# Patient Record
Sex: MALE
[De-identification: names, ages, dates, MRNs, and addresses within clinical notes are randomized per-mention and may not be internally consistent; named-entity substitution may affect disease eponyms.]

---

## 2019-11-14 ENCOUNTER — HOSPITAL ENCOUNTER (EMERGENCY)
Dept: HOSPITAL 90 - EDH | Age: 44
Discharge: HOME | End: 2019-11-14
Payer: MEDICAID

## 2019-11-14 DIAGNOSIS — Z88.0: ICD-10-CM

## 2019-11-14 DIAGNOSIS — J20.9: Primary | ICD-10-CM

## 2019-11-14 PROCEDURE — 71046 X-RAY EXAM CHEST 2 VIEWS: CPT

## 2019-11-14 PROCEDURE — 87804 INFLUENZA ASSAY W/OPTIC: CPT

## 2021-11-11 ENCOUNTER — HOSPITAL ENCOUNTER (EMERGENCY)
Dept: HOSPITAL 90 - EDH | Age: 46
Discharge: HOME | End: 2021-11-11
Payer: MEDICAID

## 2021-11-11 VITALS — DIASTOLIC BLOOD PRESSURE: 103 MMHG | SYSTOLIC BLOOD PRESSURE: 163 MMHG

## 2021-11-11 VITALS — HEIGHT: 67 IN | BODY MASS INDEX: 22.77 KG/M2 | WEIGHT: 145.06 LBS

## 2021-11-11 DIAGNOSIS — F10.20: ICD-10-CM
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
## 2021-11-11 DIAGNOSIS — S00.81XA: ICD-10-CM
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
## 2021-11-11 DIAGNOSIS — S00.03XA: Primary | ICD-10-CM

## 2021-11-11 LAB
ALBUMIN SERPL-MCNC: 4.3 G/DL (ref 3.5–5)
ALT SERPL-CCNC: 170 U/L (ref 12–78)
AST SERPL-CCNC: 151 U/L (ref 10–37)
BASOPHILS NFR BLD AUTO: 1.7 % (ref 0–5)
BILIRUB SERPL-MCNC: 0.3 MG/DL (ref 0.2–1)
BUN SERPL-MCNC: 9 MG/DL (ref 7–18)
CHLORIDE SERPL-SCNC: 99 MMOL/L (ref 101–111)
CO2 SERPL-SCNC: 29 MMOL/L (ref 21–32)
CREAT SERPL-MCNC: 0.8 MG/DL (ref 0.5–1.5)
EOSINOPHIL NFR BLD AUTO: 1.7 % (ref 0–8)
ERYTHROCYTE [DISTWIDTH] IN BLOOD BY AUTOMATED COUNT: 13.2 % (ref 11–15.5)
GFR SERPL CREATININE-BSD FRML MDRD: 112 ML/MIN (ref 60–?)
GLUCOSE SERPL-MCNC: 102 MG/DL (ref 70–105)
HCT VFR BLD AUTO: 39.2 % (ref 42–54)
LYMPHOCYTES NFR SPEC AUTO: 17.7 % (ref 21–51)
MCH RBC QN AUTO: 31.8 PG (ref 27–33)
MCHC RBC AUTO-ENTMCNC: 34.2 G/DL (ref 32–36)
MCV RBC AUTO: 93.1 FL (ref 79–99)
MONOCYTES NFR BLD AUTO: 11.2 % (ref 3–13)
NEUTROPHILS NFR BLD AUTO: 67.3 % (ref 40–77)
NRBC BLD MANUAL-RTO: 0 % (ref 0–0.19)
PLATELET # BLD AUTO: 289 K/UL (ref 130–400)
POTASSIUM SERPL-SCNC: 3.6 MMOL/L (ref 3.5–5.1)
PROT SERPL-MCNC: 8.4 G/DL (ref 6–8.3)
RBC # BLD AUTO: 4.21 MIL/UL (ref 4.5–6.2)
SODIUM SERPL-SCNC: 137 MMOL/L (ref 136–145)
WBC # BLD AUTO: 4.6 K/UL (ref 4.8–10.8)

## 2021-11-11 PROCEDURE — 80053 COMPREHEN METABOLIC PANEL: CPT

## 2021-11-11 PROCEDURE — 36415 COLL VENOUS BLD VENIPUNCTURE: CPT

## 2021-11-11 PROCEDURE — 72125 CT NECK SPINE W/O DYE: CPT

## 2021-11-11 PROCEDURE — 70450 CT HEAD/BRAIN W/O DYE: CPT

## 2021-11-11 PROCEDURE — 85025 COMPLETE CBC W/AUTO DIFF WBC: CPT

## 2021-11-11 PROCEDURE — 70486 CT MAXILLOFACIAL W/O DYE: CPT

## 2021-11-12 ENCOUNTER — HOSPITAL ENCOUNTER (EMERGENCY)
Dept: HOSPITAL 90 - EDH | Age: 46
Discharge: HOME | End: 2021-11-12
Payer: MEDICAID

## 2021-11-12 VITALS — SYSTOLIC BLOOD PRESSURE: 135 MMHG | DIASTOLIC BLOOD PRESSURE: 70 MMHG

## 2021-11-12 VITALS — HEIGHT: 65 IN | BODY MASS INDEX: 21.34 KG/M2 | WEIGHT: 128.09 LBS

## 2021-11-12 DIAGNOSIS — S00.83XA: ICD-10-CM
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
## 2021-11-12 DIAGNOSIS — S00.12XA: Primary | ICD-10-CM
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
## 2021-11-12 DIAGNOSIS — Z88.0: ICD-10-CM

## 2021-12-10 ENCOUNTER — HOSPITAL ENCOUNTER (EMERGENCY)
Dept: HOSPITAL 90 - EDH | Age: 46
Discharge: HOME | End: 2021-12-10
Payer: MEDICAID

## 2021-12-10 VITALS — WEIGHT: 149.91 LBS | BODY MASS INDEX: 25.59 KG/M2 | HEIGHT: 64 IN

## 2021-12-10 VITALS — SYSTOLIC BLOOD PRESSURE: 135 MMHG | DIASTOLIC BLOOD PRESSURE: 89 MMHG

## 2021-12-10 DIAGNOSIS — R11.10: ICD-10-CM

## 2021-12-10 DIAGNOSIS — F10.129: Primary | ICD-10-CM

## 2022-04-13 ENCOUNTER — HOSPITAL ENCOUNTER (EMERGENCY)
Dept: HOSPITAL 90 - EDH | Age: 47
Discharge: HOME | End: 2022-04-13
Payer: MEDICAID

## 2022-04-13 VITALS — BODY MASS INDEX: 24.17 KG/M2 | HEIGHT: 65 IN | WEIGHT: 145.06 LBS

## 2022-04-13 VITALS — DIASTOLIC BLOOD PRESSURE: 87 MMHG | SYSTOLIC BLOOD PRESSURE: 135 MMHG
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
## 2022-04-13 DIAGNOSIS — H61.23: ICD-10-CM
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
## 2022-04-13 DIAGNOSIS — S50.01XA: Primary | ICD-10-CM
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
## 2023-01-27 ENCOUNTER — HOSPITAL ENCOUNTER (EMERGENCY)
Dept: HOSPITAL 90 - EDH | Age: 48
Discharge: HOME | End: 2023-01-27
Payer: MEDICAID

## 2023-01-27 VITALS — SYSTOLIC BLOOD PRESSURE: 155 MMHG | DIASTOLIC BLOOD PRESSURE: 82 MMHG

## 2023-01-27 VITALS — WEIGHT: 139.99 LBS | HEIGHT: 62 IN | BODY MASS INDEX: 25.76 KG/M2

## 2023-01-27 DIAGNOSIS — F10.239: Primary | ICD-10-CM

## 2023-01-27 DIAGNOSIS — Z88.0: ICD-10-CM

## 2023-01-27 LAB
ALBUMIN SERPL-MCNC: 4.3 G/DL (ref 3.5–5)
ALT SERPL-CCNC: 146 U/L (ref 12–78)
AMPHETAMINES UR QL SCN: NEGATIVE
APPEARANCE UR: CLEAR
AST SERPL-CCNC: 144 U/L (ref 10–37)
BACTERIA URNS QL MICRO: (no result) /HPF
BARBITURATES UR QL SCN: NEGATIVE
BASOPHILS NFR BLD AUTO: 0.9 % (ref 0–5)
BENZODIAZ UR QL SCN: NEGATIVE
BILIRUB UR QL STRIP: NEGATIVE MG/DL
BUN SERPL-MCNC: 16 MG/DL (ref 7–18)
BZE UR QL SCN: NEGATIVE
CANNABINOIDS UR QL SCN: NEGATIVE
CHLORIDE SERPL-SCNC: 92 MMOL/L (ref 101–111)
CK SERPL-CCNC: 199 U/L (ref 21–232)
CO2 SERPL-SCNC: 33 MMOL/L (ref 21–32)
COLOR UR: YELLOW
CREAT SERPL-MCNC: 1 MG/DL (ref 0.5–1.5)
DEPRECATED SQUAMOUS URNS QL MICRO: (no result) /HPF (ref 0–2)
EOSINOPHIL NFR BLD AUTO: 0.9 % (ref 0–8)
ERYTHROCYTE [DISTWIDTH] IN BLOOD BY AUTOMATED COUNT: 12.4 % (ref 11–15.5)
ETHANOL SERPL-MCNC: < 3 MG/DL (ref 0–10)
GFR SERPL CREATININE-BSD FRML MDRD: 86 ML/MIN (ref 60–?)
GLUCOSE SERPL-MCNC: 102 MG/DL (ref 70–105)
GLUCOSE UR STRIP-MCNC: NEGATIVE MG/DL
HCT VFR BLD AUTO: 41.4 % (ref 42–54)
HGB UR QL STRIP: (no result)
KETONES UR STRIP-MCNC: 20 MG/DL
LEUKOCYTE ESTERASE UR QL STRIP: NEGATIVE LEU/UL
LYMPHOCYTES NFR SPEC AUTO: 8.5 % (ref 21–51)
MAGNESIUM SERPL-MCNC: 1.8 MG/DL (ref 1.8–2.4)
MCH RBC QN AUTO: 31.9 PG (ref 27–33)
MCHC RBC AUTO-ENTMCNC: 34.1 G/DL (ref 32–36)
MCV RBC AUTO: 93.7 FL (ref 79–99)
MONOCYTES NFR BLD AUTO: 14.2 % (ref 3–13)
MUCOUS THREADS URNS QL MICRO: (no result) LPF
NEUTROPHILS NFR BLD AUTO: 75.2 % (ref 40–77)
NITRITE UR QL STRIP: NEGATIVE
NRBC BLD MANUAL-RTO: 0 % (ref 0–0.19)
OPIATES UR QL SCN: NEGATIVE
PCP UR QL SCN: NEGATIVE
PH UR STRIP: 7 [PH] (ref 5–8)
PLATELET # BLD AUTO: 267 K/UL (ref 130–400)
POTASSIUM SERPL-SCNC: 3.7 MMOL/L (ref 3.5–5.1)
PROT SERPL-MCNC: 8.9 G/DL (ref 6–8.3)
PROT UR QL STRIP: 50 MG/DL
RBC # BLD AUTO: 4.42 MIL/UL (ref 4.5–6.2)
RBC #/AREA URNS HPF: (no result) /HPF (ref 0–1)
SODIUM SERPL-SCNC: 131 MMOL/L (ref 136–145)
SP GR UR STRIP: 1.01 (ref 1–1.03)
UROBILINOGEN UR STRIP-MCNC: 2 MG/DL (ref 0.2–1)
WBC # BLD AUTO: 7 K/UL (ref 4.8–10.8)
WBC #/AREA URNS HPF: (no result) /HPF (ref 0–1)

## 2023-01-27 PROCEDURE — 36415 COLL VENOUS BLD VENIPUNCTURE: CPT

## 2023-01-27 PROCEDURE — 81001 URINALYSIS AUTO W/SCOPE: CPT

## 2023-01-27 PROCEDURE — 83735 ASSAY OF MAGNESIUM: CPT

## 2023-01-27 PROCEDURE — 80053 COMPREHEN METABOLIC PANEL: CPT

## 2023-01-27 PROCEDURE — 99284 EMERGENCY DEPT VISIT MOD MDM: CPT

## 2023-01-27 PROCEDURE — 93005 ELECTROCARDIOGRAM TRACING: CPT

## 2023-01-27 PROCEDURE — 82550 ASSAY OF CK (CPK): CPT

## 2023-01-27 PROCEDURE — 96376 TX/PRO/DX INJ SAME DRUG ADON: CPT

## 2023-01-27 PROCEDURE — 96375 TX/PRO/DX INJ NEW DRUG ADDON: CPT

## 2023-01-27 PROCEDURE — 85025 COMPLETE CBC W/AUTO DIFF WBC: CPT

## 2023-01-27 PROCEDURE — 96374 THER/PROPH/DIAG INJ IV PUSH: CPT

## 2023-01-27 PROCEDURE — 83605 ASSAY OF LACTIC ACID: CPT

## 2023-01-27 PROCEDURE — 96361 HYDRATE IV INFUSION ADD-ON: CPT

## 2023-01-27 PROCEDURE — 80305 DRUG TEST PRSMV DIR OPT OBS: CPT

## 2023-10-23 ENCOUNTER — HOSPITAL ENCOUNTER (EMERGENCY)
Dept: HOSPITAL 90 - EDH | Age: 48
Discharge: HOME | End: 2023-10-23
Payer: MEDICAID

## 2023-10-23 VITALS
DIASTOLIC BLOOD PRESSURE: 78 MMHG | RESPIRATION RATE: 20 BRPM | SYSTOLIC BLOOD PRESSURE: 126 MMHG | HEART RATE: 89 BPM | OXYGEN SATURATION: 98 %

## 2023-10-23 VITALS — HEIGHT: 65 IN | WEIGHT: 130.07 LBS | BODY MASS INDEX: 21.67 KG/M2

## 2023-10-23 DIAGNOSIS — Z88.0: ICD-10-CM

## 2023-10-23 DIAGNOSIS — F10.20: Primary | ICD-10-CM

## 2023-10-23 DIAGNOSIS — R10.12: ICD-10-CM

## 2023-10-23 DIAGNOSIS — R11.2: ICD-10-CM
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
## 2023-10-23 DIAGNOSIS — R10.13: ICD-10-CM

## 2023-10-23 LAB
ALBUMIN SERPL-MCNC: 4.6 G/DL (ref 3.5–5)
ALT SERPL-CCNC: 84 U/L (ref 12–78)
AMPHETAMINES UR QL SCN: NEGATIVE
APPEARANCE UR: CLEAR
AST SERPL-CCNC: 70 U/L (ref 10–37)
BARBITURATES UR QL SCN: NEGATIVE
BASOPHILS # BLD AUTO: 0.03 K/UL (ref 0–0.2)
BASOPHILS NFR BLD AUTO: 0.4 % (ref 0–5)
BENZODIAZ UR QL SCN: NEGATIVE
BILIRUB SERPL-MCNC: 0.8 MG/DL (ref 0.2–1)
BILIRUB UR QL STRIP: NEGATIVE MG/DL
BUN SERPL-MCNC: 14 MG/DL (ref 7–18)
BZE UR QL SCN: NEGATIVE
CANNABINOIDS UR QL SCN: NEGATIVE
CHLORIDE SERPL-SCNC: 94 MMOL/L (ref 101–111)
CO2 SERPL-SCNC: 33 MMOL/L (ref 21–32)
COLOR UR: YELLOW
CREAT SERPL-MCNC: 1 MG/DL (ref 0.5–1.5)
EOSINOPHIL # BLD AUTO: 0 K/UL (ref 0–0.7)
EOSINOPHIL NFR BLD AUTO: 0 % (ref 0–8)
ERYTHROCYTE [DISTWIDTH] IN BLOOD BY AUTOMATED COUNT: 12.6 % (ref 11–15.5)
ETHANOL SERPL-MCNC: < 3 MG/DL (ref 0–10)
GFR SERPL CREATININE-BSD FRML MDRD: 94 ML/MIN (ref 90–?)
GLUCOSE SERPL-MCNC: 154 MG/DL (ref 70–105)
GLUCOSE UR STRIP-MCNC: NEGATIVE MG/DL
HCT VFR BLD AUTO: 41.6 % (ref 42–54)
HGB UR QL STRIP: (no result)
IMM GRANULOCYTES # BLD: 0.03 K/UL (ref 0–1)
KETONES UR STRIP-MCNC: 15 MG/DL
LEUKOCYTE ESTERASE UR QL STRIP: NEGATIVE LEU/UL
LYMPHOCYTES # SPEC AUTO: 0.2 K/UL (ref 1–4.8)
LYMPHOCYTES NFR SPEC AUTO: 2.6 % (ref 21–51)
MCH RBC QN AUTO: 31.2 PG (ref 27–33)
MCHC RBC AUTO-ENTMCNC: 34.1 G/DL (ref 32–36)
MCV RBC AUTO: 91.4 FL (ref 79–99)
MICRO URNS: YES
MONOCYTES # BLD AUTO: 0.5 K/UL (ref 0.1–1)
MONOCYTES NFR BLD AUTO: 6.5 % (ref 3–13)
NEUTROPHILS # BLD AUTO: 6.9 K/UL (ref 1.8–7.7)
NEUTROPHILS NFR BLD AUTO: 90.1 % (ref 40–77)
NITRITE UR QL STRIP: NEGATIVE
NRBC BLD MANUAL-RTO: 0 % (ref 0–0.19)
OPIATES UR QL SCN: NEGATIVE
PCP UR QL SCN: NEGATIVE
PH UR STRIP: 8 [PH] (ref 5–8)
PLATELET # BLD AUTO: 273 K/UL (ref 130–400)
POTASSIUM SERPL-SCNC: 3.4 MMOL/L (ref 3.5–5.1)
PROT SERPL-MCNC: 9.3 G/DL (ref 6–8.3)
PROT UR QL STRIP: 100 MG/DL
RBC # BLD AUTO: 4.55 MIL/UL (ref 4.5–6.2)
RBC #/AREA URNS HPF: (no result) /HPF (ref 0–1)
SODIUM SERPL-SCNC: 135 MMOL/L (ref 136–145)
SP GR UR STRIP: 1.01 (ref 1–1.03)
UROBILINOGEN UR STRIP-MCNC: 0.2 MG/DL (ref 0.2–1)
WBC # BLD AUTO: 7.7 K/UL (ref 4.8–10.8)
WBC #/AREA URNS HPF: (no result) /HPF (ref 0–1)
WBC MORPH BLD: (no result)

## 2023-10-23 PROCEDURE — 81001 URINALYSIS AUTO W/SCOPE: CPT

## 2023-10-23 PROCEDURE — 80053 COMPREHEN METABOLIC PANEL: CPT

## 2023-10-23 PROCEDURE — 82140 ASSAY OF AMMONIA: CPT

## 2023-10-23 PROCEDURE — 96361 HYDRATE IV INFUSION ADD-ON: CPT

## 2023-10-23 PROCEDURE — 74177 CT ABD & PELVIS W/CONTRAST: CPT

## 2023-10-23 PROCEDURE — C9113 INJ PANTOPRAZOLE SODIUM, VIA: HCPCS

## 2023-10-23 PROCEDURE — 36415 COLL VENOUS BLD VENIPUNCTURE: CPT

## 2023-10-23 PROCEDURE — 96374 THER/PROPH/DIAG INJ IV PUSH: CPT

## 2023-10-23 PROCEDURE — 85025 COMPLETE CBC W/AUTO DIFF WBC: CPT

## 2023-10-23 PROCEDURE — 80305 DRUG TEST PRSMV DIR OPT OBS: CPT

## 2023-10-23 PROCEDURE — 83690 ASSAY OF LIPASE: CPT

## 2023-10-23 PROCEDURE — 96375 TX/PRO/DX INJ NEW DRUG ADDON: CPT

## 2023-10-23 PROCEDURE — 99285 EMERGENCY DEPT VISIT HI MDM: CPT

## 2023-10-27 ENCOUNTER — HOSPITAL ENCOUNTER (EMERGENCY)
Dept: HOSPITAL 90 - EDH | Age: 48
Discharge: HOME | End: 2023-10-27
Payer: MEDICAID

## 2023-10-27 VITALS
SYSTOLIC BLOOD PRESSURE: 127 MMHG | DIASTOLIC BLOOD PRESSURE: 77 MMHG | HEART RATE: 66 BPM | RESPIRATION RATE: 18 BRPM | OXYGEN SATURATION: 98 %

## 2023-10-27 VITALS — WEIGHT: 125 LBS | HEIGHT: 61 IN | BODY MASS INDEX: 23.6 KG/M2

## 2023-10-27 VITALS
DIASTOLIC BLOOD PRESSURE: 84 MMHG | SYSTOLIC BLOOD PRESSURE: 126 MMHG | OXYGEN SATURATION: 100 % | HEART RATE: 78 BPM | RESPIRATION RATE: 16 BRPM
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
## 2023-10-27 DIAGNOSIS — S90.32XA: Primary | ICD-10-CM

## 2023-10-27 DIAGNOSIS — F10.20: ICD-10-CM

## 2023-10-27 DIAGNOSIS — F41.9: ICD-10-CM

## 2023-10-27 DIAGNOSIS — Z88.0: ICD-10-CM
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
## 2023-10-27 DIAGNOSIS — R91.8: ICD-10-CM
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
## 2023-10-27 DIAGNOSIS — F20.9: ICD-10-CM

## 2023-10-27 DIAGNOSIS — E87.6: Primary | ICD-10-CM

## 2023-10-27 DIAGNOSIS — E86.0: ICD-10-CM
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
## 2023-10-27 LAB
ALBUMIN SERPL-MCNC: 4.5 G/DL (ref 3.5–5)
ALT SERPL-CCNC: 80 U/L (ref 12–78)
AMPHETAMINES UR QL SCN: NEGATIVE
APPEARANCE UR: (no result)
AST SERPL-CCNC: 92 U/L (ref 10–37)
BARBITURATES UR QL SCN: NEGATIVE
BASOPHILS # BLD AUTO: 0.02 K/UL (ref 0–0.2)
BASOPHILS NFR BLD AUTO: 0.2 % (ref 0–5)
BENZODIAZ UR QL SCN: NEGATIVE
BILIRUB SERPL-MCNC: 1.2 MG/DL (ref 0.2–1)
BILIRUB UR QL STRIP: NEGATIVE MG/DL
BUN SERPL-MCNC: 27 MG/DL (ref 7–18)
BUN SERPL-MCNC: 29 MG/DL (ref 7–18)
BZE UR QL SCN: NEGATIVE
CANNABINOIDS UR QL SCN: NEGATIVE
CHLORIDE SERPL-SCNC: 95 MMOL/L (ref 101–111)
CHLORIDE SERPL-SCNC: 95 MMOL/L (ref 101–111)
CO2 SERPL-SCNC: 29 MMOL/L (ref 21–32)
CO2 SERPL-SCNC: 29 MMOL/L (ref 21–32)
COLOR UR: YELLOW
CREAT SERPL-MCNC: 1.2 MG/DL (ref 0.5–1.5)
CREAT SERPL-MCNC: 1.4 MG/DL (ref 0.5–1.5)
EOSINOPHIL # BLD AUTO: 0.01 K/UL (ref 0–0.7)
EOSINOPHIL NFR BLD AUTO: 0.1 % (ref 0–8)
ERYTHROCYTE [DISTWIDTH] IN BLOOD BY AUTOMATED COUNT: 12.4 % (ref 11–15.5)
ETHANOL SERPL-MCNC: < 3 MG/DL (ref 0–10)
GFR SERPL CREATININE-BSD FRML MDRD: 63 ML/MIN (ref 90–?)
GFR SERPL CREATININE-BSD FRML MDRD: 76 ML/MIN (ref 90–?)
GLUCOSE SERPL-MCNC: 80 MG/DL (ref 70–105)
GLUCOSE SERPL-MCNC: 91 MG/DL (ref 70–105)
GLUCOSE UR STRIP-MCNC: NEGATIVE MG/DL
HCT VFR BLD AUTO: 38 % (ref 42–54)
HGB UR QL STRIP: (no result)
IMM GRANULOCYTES # BLD: 0.05 K/UL (ref 0–1)
KETONES UR STRIP-MCNC: 10 MG/DL
LEUKOCYTE ESTERASE UR QL STRIP: NEGATIVE LEU/UL
LYMPHOCYTES # SPEC AUTO: 0.3 K/UL (ref 1–4.8)
LYMPHOCYTES NFR SPEC AUTO: 2.3 % (ref 21–51)
MCH RBC QN AUTO: 32 PG (ref 27–33)
MCHC RBC AUTO-ENTMCNC: 35.3 G/DL (ref 32–36)
MCV RBC AUTO: 90.7 FL (ref 79–99)
MICRO URNS: YES
MONOCYTES # BLD AUTO: 1.1 K/UL (ref 0.1–1)
MONOCYTES NFR BLD AUTO: 8.8 % (ref 3–13)
MUCOUS THREADS URNS QL MICRO: (no result) LPF
NEUTROPHILS # BLD AUTO: 10.6 K/UL (ref 1.8–7.7)
NEUTROPHILS NFR BLD AUTO: 88.2 % (ref 40–77)
NITRITE UR QL STRIP: NEGATIVE
NRBC BLD MANUAL-RTO: 0 % (ref 0–0.19)
OPIATES UR QL SCN: NEGATIVE
PCP UR QL SCN: NEGATIVE
PH UR STRIP: 6 [PH] (ref 5–8)
PLATELET # BLD AUTO: 245 K/UL (ref 130–400)
POTASSIUM SERPL-SCNC: 2.7 MMOL/L (ref 3.5–5.1)
POTASSIUM SERPL-SCNC: 3.4 MMOL/L (ref 3.5–5.1)
PROT SERPL-MCNC: 8.8 G/DL (ref 6–8.3)
PROT UR QL STRIP: 70 MG/DL
RBC # BLD AUTO: 4.19 MIL/UL (ref 4.5–6.2)
RBC #/AREA URNS HPF: (no result) /HPF (ref 0–1)
SODIUM SERPL-SCNC: 132 MMOL/L (ref 136–145)
SODIUM SERPL-SCNC: 133 MMOL/L (ref 136–145)
SP GR UR STRIP: 1.02 (ref 1–1.03)
UROBILINOGEN UR STRIP-MCNC: 0.2 MG/DL (ref 0.2–1)
WBC # BLD AUTO: 12 K/UL (ref 4.8–10.8)
WBC #/AREA URNS HPF: (no result) /HPF (ref 0–1)

## 2023-10-27 PROCEDURE — 81001 URINALYSIS AUTO W/SCOPE: CPT

## 2023-10-27 PROCEDURE — 36415 COLL VENOUS BLD VENIPUNCTURE: CPT

## 2023-10-27 PROCEDURE — 80305 DRUG TEST PRSMV DIR OPT OBS: CPT

## 2023-10-27 PROCEDURE — 72125 CT NECK SPINE W/O DYE: CPT

## 2023-10-27 PROCEDURE — 80053 COMPREHEN METABOLIC PANEL: CPT

## 2023-10-27 PROCEDURE — 85025 COMPLETE CBC W/AUTO DIFF WBC: CPT

## 2023-10-27 PROCEDURE — 90714 TD VACC NO PRESV 7 YRS+ IM: CPT

## 2023-10-27 PROCEDURE — 73630 X-RAY EXAM OF FOOT: CPT

## 2023-10-27 PROCEDURE — 83735 ASSAY OF MAGNESIUM: CPT

## 2023-10-27 PROCEDURE — 70450 CT HEAD/BRAIN W/O DYE: CPT

## 2023-10-27 PROCEDURE — 99283 EMERGENCY DEPT VISIT LOW MDM: CPT

## 2023-10-27 PROCEDURE — 96360 HYDRATION IV INFUSION INIT: CPT

## 2023-10-27 PROCEDURE — 90471 IMMUNIZATION ADMIN: CPT

## 2023-10-27 PROCEDURE — 71045 X-RAY EXAM CHEST 1 VIEW: CPT

## 2023-10-27 PROCEDURE — 72170 X-RAY EXAM OF PELVIS: CPT

## 2023-10-27 PROCEDURE — 80048 BASIC METABOLIC PNL TOTAL CA: CPT

## 2023-10-27 PROCEDURE — 99285 EMERGENCY DEPT VISIT HI MDM: CPT

## 2024-04-21 ENCOUNTER — HOSPITAL ENCOUNTER (EMERGENCY)
Dept: HOSPITAL 90 - EDH | Age: 49
Discharge: LEFT BEFORE DISCHARGE | End: 2024-04-21
Payer: MEDICAID

## 2024-04-21 VITALS — WEIGHT: 139.99 LBS | HEIGHT: 64 IN | BODY MASS INDEX: 23.9 KG/M2

## 2024-04-21 VITALS
SYSTOLIC BLOOD PRESSURE: 132 MMHG | HEART RATE: 90 BPM | DIASTOLIC BLOOD PRESSURE: 88 MMHG | OXYGEN SATURATION: 98 % | RESPIRATION RATE: 18 BRPM

## 2024-04-21 DIAGNOSIS — Z88.0: ICD-10-CM

## 2024-04-21 DIAGNOSIS — F10.129: Primary | ICD-10-CM
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
## 2024-04-21 LAB
ALBUMIN SERPL-MCNC: 4 G/DL (ref 3.5–5)
ALT SERPL-CCNC: 20 U/L (ref 12–78)
AMPHETAMINES UR QL SCN: NEGATIVE
APAP SERPL-MCNC: < 1 MCG/ML (ref 10–29)
APPEARANCE UR: CLEAR
AST SERPL-CCNC: 17 U/L (ref 10–37)
BARBITURATES UR QL SCN: NEGATIVE
BASOPHILS # BLD AUTO: 0.05 K/UL (ref 0–0.2)
BASOPHILS NFR BLD AUTO: 0.9 % (ref 0–5)
BENZODIAZ UR QL SCN: NEGATIVE
BILIRUB SERPL-MCNC: 0.2 MG/DL (ref 0.2–1)
BILIRUB UR QL STRIP: NEGATIVE MG/DL
BUN SERPL-MCNC: 8 MG/DL (ref 7–18)
BZE UR QL SCN: NEGATIVE
CANNABINOIDS UR QL SCN: NEGATIVE
CHLORIDE SERPL-SCNC: 101 MMOL/L (ref 101–111)
CO2 SERPL-SCNC: 32 MMOL/L (ref 21–32)
COLOR UR: COLORLESS
CREAT SERPL-MCNC: 0.8 MG/DL (ref 0.5–1.3)
EOSINOPHIL # BLD AUTO: 0.39 K/UL (ref 0–0.7)
EOSINOPHIL NFR BLD AUTO: 6.7 % (ref 0–8)
ERYTHROCYTE [DISTWIDTH] IN BLOOD BY AUTOMATED COUNT: 12.5 % (ref 11–15.5)
ETHANOL SERPL-MCNC: 227 MG/DL (ref 0–10)
GFR SERPL CREATININE-BSD FRML MDRD: 110 ML/MIN (ref 90–?)
GLUCOSE SERPL-MCNC: 123 MG/DL (ref 70–105)
GLUCOSE UR STRIP-MCNC: NEGATIVE MG/DL
HCT VFR BLD AUTO: 41.7 % (ref 42–54)
HGB UR QL STRIP: NEGATIVE
IMM GRANULOCYTES # BLD: 0.01 K/UL (ref 0–1)
KETONES UR STRIP-MCNC: NEGATIVE MG/DL
LEUKOCYTE ESTERASE UR QL STRIP: NEGATIVE LEU/UL
LYMPHOCYTES # SPEC AUTO: 1.5 K/UL (ref 1–4.8)
LYMPHOCYTES NFR SPEC AUTO: 26.5 % (ref 21–51)
MCH RBC QN AUTO: 30 PG (ref 27–33)
MCHC RBC AUTO-ENTMCNC: 35.5 G/DL (ref 32–36)
MCV RBC AUTO: 84.6 FL (ref 79–99)
MICRO URNS: YES
MONOCYTES # BLD AUTO: 0.5 K/UL (ref 0.1–1)
MONOCYTES NFR BLD AUTO: 7.9 % (ref 3–13)
NEUTROPHILS # BLD AUTO: 3.4 K/UL (ref 1.8–7.7)
NEUTROPHILS NFR BLD AUTO: 57.8 % (ref 40–77)
NITRITE UR QL STRIP: NEGATIVE
NRBC BLD MANUAL-RTO: 0 % (ref 0–0.19)
OPIATES UR QL SCN: NEGATIVE
PCP UR QL SCN: NEGATIVE
PH UR STRIP: 5.5 [PH] (ref 5–8)
PLATELET # BLD AUTO: 377 K/UL (ref 130–400)
POTASSIUM SERPL-SCNC: 3.7 MMOL/L (ref 3.5–5.1)
PROT SERPL-MCNC: 8.4 G/DL (ref 6–8.3)
PROT UR QL STRIP: NEGATIVE MG/DL
RBC # BLD AUTO: 4.93 MIL/UL (ref 4.5–6.2)
SALICYLATES SERPL-MCNC: < 2.8 MG/DL (ref 2.8–20)
SODIUM SERPL-SCNC: 141 MMOL/L (ref 136–145)
SP GR UR STRIP: 1 (ref 1–1.03)
UROBILINOGEN UR STRIP-MCNC: 0.2 MG/DL (ref 0.2–1)
WBC # BLD AUTO: 5.8 K/UL (ref 4.8–10.8)
WBC #/AREA URNS HPF: (no result) /HPF (ref 0–1)

## 2024-04-21 PROCEDURE — 80305 DRUG TEST PRSMV DIR OPT OBS: CPT

## 2024-04-21 PROCEDURE — 99281 EMR DPT VST MAYX REQ PHY/QHP: CPT

## 2024-04-21 PROCEDURE — 85025 COMPLETE CBC W/AUTO DIFF WBC: CPT

## 2024-04-21 PROCEDURE — 36415 COLL VENOUS BLD VENIPUNCTURE: CPT

## 2024-04-21 PROCEDURE — 81001 URINALYSIS AUTO W/SCOPE: CPT

## 2024-04-21 PROCEDURE — G0481 DRUG TEST DEF 8-14 CLASSES: HCPCS

## 2024-04-21 PROCEDURE — 80053 COMPREHEN METABOLIC PANEL: CPT

## 2024-10-14 ENCOUNTER — HOSPITAL ENCOUNTER (EMERGENCY)
Dept: HOSPITAL 90 - EDH | Age: 49
LOS: 1 days | Discharge: HOME | End: 2024-10-15
Payer: MEDICAID

## 2024-10-14 VITALS — HEIGHT: 65 IN | BODY MASS INDEX: 21.67 KG/M2 | WEIGHT: 130.07 LBS
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
## 2024-10-14 DIAGNOSIS — Z88.0: ICD-10-CM

## 2024-10-14 DIAGNOSIS — E87.6: ICD-10-CM

## 2024-10-14 DIAGNOSIS — F10.959: Primary | ICD-10-CM

## 2024-10-14 LAB
BASOPHILS # BLD AUTO: 0.04 K/UL (ref 0–0.2)
BASOPHILS NFR BLD AUTO: 1 % (ref 0–5)
EOSINOPHIL # BLD AUTO: 0.21 K/UL (ref 0–0.7)
EOSINOPHIL NFR BLD AUTO: 5.2 % (ref 0–8)
ERYTHROCYTE [DISTWIDTH] IN BLOOD BY AUTOMATED COUNT: 12.8 % (ref 11–15.5)
HCT VFR BLD AUTO: 43.1 % (ref 42–54)
IMM GRANULOCYTES # BLD: 0.01 K/UL (ref 0–1)
LYMPHOCYTES # SPEC AUTO: 1.2 K/UL (ref 1–4.8)
LYMPHOCYTES NFR SPEC AUTO: 30.8 % (ref 21–51)
MCH RBC QN AUTO: 29.9 PG (ref 27–33)
MCHC RBC AUTO-ENTMCNC: 34.1 G/DL (ref 32–36)
MCV RBC AUTO: 87.8 FL (ref 79–99)
MONOCYTES # BLD AUTO: 0.5 K/UL (ref 0.1–1)
MONOCYTES NFR BLD AUTO: 12.4 % (ref 3–13)
NEUTROPHILS # BLD AUTO: 2 K/UL (ref 1.8–7.7)
NEUTROPHILS NFR BLD AUTO: 50.4 % (ref 40–77)
NRBC BLD MANUAL-RTO: 0 % (ref 0–0.19)
PLATELET # BLD AUTO: 292 K/UL (ref 130–400)
RBC # BLD AUTO: 4.91 MIL/UL (ref 4.5–6.2)
WBC # BLD AUTO: 4 K/UL (ref 4.8–10.8)

## 2024-10-14 PROCEDURE — 96365 THER/PROPH/DIAG IV INF INIT: CPT

## 2024-10-14 PROCEDURE — 36415 COLL VENOUS BLD VENIPUNCTURE: CPT

## 2024-10-14 PROCEDURE — 80305 DRUG TEST PRSMV DIR OPT OBS: CPT

## 2024-10-14 PROCEDURE — 85025 COMPLETE CBC W/AUTO DIFF WBC: CPT

## 2024-10-14 PROCEDURE — G0481 DRUG TEST DEF 8-14 CLASSES: HCPCS

## 2024-10-14 PROCEDURE — 99284 EMERGENCY DEPT VISIT MOD MDM: CPT

## 2024-10-14 PROCEDURE — 80048 BASIC METABOLIC PNL TOTAL CA: CPT

## 2024-10-15 VITALS
TEMPERATURE: 97.8 F | HEART RATE: 88 BPM | RESPIRATION RATE: 16 BRPM | DIASTOLIC BLOOD PRESSURE: 78 MMHG | OXYGEN SATURATION: 97 % | SYSTOLIC BLOOD PRESSURE: 132 MMHG

## 2024-10-15 LAB
AMPHETAMINES UR QL SCN: NEGATIVE
APAP SERPL-MCNC: < 1 MCG/ML (ref 10–29)
BARBITURATES UR QL SCN: NEGATIVE
BENZODIAZ UR QL SCN: NEGATIVE
BUN SERPL-MCNC: 5 MG/DL (ref 7–18)
BZE UR QL SCN: NEGATIVE
CANNABINOIDS UR QL SCN: NEGATIVE
CHLORIDE SERPL-SCNC: 97 MMOL/L (ref 101–111)
CO2 SERPL-SCNC: 29 MMOL/L (ref 21–32)
CREAT SERPL-MCNC: 1 MG/DL (ref 0.5–1.3)
ETHANOL SERPL-MCNC: 300 MG/DL (ref 0–10)
GFR SERPL CREATININE-BSD FRML MDRD: 93 ML/MIN (ref 90–?)
GLUCOSE SERPL-MCNC: 148 MG/DL (ref 70–105)
OPIATES UR QL SCN: NEGATIVE
PCP UR QL SCN: NEGATIVE
POTASSIUM SERPL-SCNC: 3.3 MMOL/L (ref 3.5–5.1)
SALICYLATES SERPL-MCNC: < 2.8 MG/DL (ref 2.8–20)
SODIUM SERPL-SCNC: 136 MMOL/L (ref 136–145)

## 2024-10-15 RX ADMIN — LEUCINE, PHENYLALANINE, LYSINE, METHIONINE, ISOLEUCINE, VALINE, HISTIDINE, THREONINE, TRYPTOPHAN, ALANINE, GLYCINE, ARGININE, PROLINE, SERINE, TYROSINE, SODIUM ACETATE, DIBASIC POTASSIUM PHOSPHATE, MAGNESIUM CHLORIDE, SODIUM CHLORIDE, CALCIUM CHLORIDE, DEXTROSE ONE MLS/HR
311; 238; 247; 170; 255; 247; 204; 179; 77; 880; 438; 489; 289; 213; 17; 297; 261; 51; 77; 33; 5 INJECTION INTRAVENOUS at 01:18

## 2025-01-27 ENCOUNTER — HOSPITAL ENCOUNTER (EMERGENCY)
Dept: HOSPITAL 90 - EDH | Age: 50
Discharge: HOME | End: 2025-01-27
Payer: MEDICAID

## 2025-01-27 VITALS
SYSTOLIC BLOOD PRESSURE: 164 MMHG | HEART RATE: 73 BPM | DIASTOLIC BLOOD PRESSURE: 89 MMHG | TEMPERATURE: 98.1 F | RESPIRATION RATE: 16 BRPM

## 2025-01-27 VITALS — WEIGHT: 130.07 LBS | HEIGHT: 65 IN | BODY MASS INDEX: 21.67 KG/M2

## 2025-01-27 DIAGNOSIS — A08.4: Primary | ICD-10-CM

## 2025-01-27 DIAGNOSIS — Z88.0: ICD-10-CM

## 2025-01-27 DIAGNOSIS — Z79.899: ICD-10-CM

## 2025-01-27 DIAGNOSIS — K57.30: ICD-10-CM

## 2025-01-27 LAB
BASOPHILS # BLD AUTO: 0.02 K/UL (ref 0–0.2)
BASOPHILS NFR BLD AUTO: 0.6 % (ref 0–5)
BUN SERPL-MCNC: 11 MG/DL (ref 7–18)
CHLORIDE SERPL-SCNC: 96 MMOL/L (ref 101–111)
CO2 SERPL-SCNC: 28 MMOL/L (ref 21–32)
CREAT SERPL-MCNC: 0.8 MG/DL (ref 0.5–1.3)
EOSINOPHIL # BLD AUTO: 0.01 K/UL (ref 0–0.7)
EOSINOPHIL NFR BLD AUTO: 0.3 % (ref 0–8)
ERYTHROCYTE [DISTWIDTH] IN BLOOD BY AUTOMATED COUNT: 13.5 % (ref 11–15.5)
GFR SERPL CREATININE-BSD FRML MDRD: 110 ML/MIN (ref 90–?)
GLUCOSE SERPL-MCNC: 103 MG/DL (ref 70–105)
HCT VFR BLD AUTO: 38.4 % (ref 42–54)
IMM GRANULOCYTES # BLD: 0.01 K/UL (ref 0–1)
LYMPHOCYTES # SPEC AUTO: 0.3 K/UL (ref 1–4.8)
LYMPHOCYTES NFR SPEC AUTO: 8 % (ref 21–51)
MCH RBC QN AUTO: 30.7 PG (ref 27–33)
MCHC RBC AUTO-ENTMCNC: 34.9 G/DL (ref 32–36)
MCV RBC AUTO: 88.1 FL (ref 79–99)
MONOCYTES # BLD AUTO: 0.5 K/UL (ref 0.1–1)
MONOCYTES NFR BLD AUTO: 12.9 % (ref 3–13)
NEUTROPHILS # BLD AUTO: 2.7 K/UL (ref 1.8–7.7)
NEUTROPHILS NFR BLD AUTO: 77.9 % (ref 40–77)
NRBC BLD MANUAL-RTO: 0 % (ref 0–0.19)
PLATELET # BLD AUTO: 222 K/UL (ref 130–400)
POTASSIUM SERPL-SCNC: 3.5 MMOL/L (ref 3.5–5.1)
RBC # BLD AUTO: 4.36 MIL/UL (ref 4.5–6.2)
SODIUM SERPL-SCNC: 134 MMOL/L (ref 136–145)
WBC # BLD AUTO: 3.5 K/UL (ref 4.8–10.8)
WBC MORPH BLD: (no result)

## 2025-01-27 PROCEDURE — 80048 BASIC METABOLIC PNL TOTAL CA: CPT

## 2025-01-27 PROCEDURE — 36415 COLL VENOUS BLD VENIPUNCTURE: CPT

## 2025-01-27 PROCEDURE — 74176 CT ABD & PELVIS W/O CONTRAST: CPT

## 2025-01-27 PROCEDURE — 99284 EMERGENCY DEPT VISIT MOD MDM: CPT

## 2025-01-27 PROCEDURE — 85025 COMPLETE CBC W/AUTO DIFF WBC: CPT

## 2025-01-27 NOTE — [facility]
CT ABDOMEN/PELVIS W/O CONTRAST



INDICATION:   ABD PAIN



TECHNIQUE: CT ABDOMEN/PELVIS W/O CONTRAST. Oral contrast was not given.

Coronal and sagittal reformats were performed. CT was performed with one

or more of the following dose reduction techniques: Automated exposure

control, adjustment of the mA and/or kV according to the patient's size,

or use of the iterative reconstruction technique.  

Comparison: 10/23/2023



FINDINGS: 

The noncontrast nature this study limits evaluation of abdominal

viscera.

No pulmonary consolidation or pleural effusion is seen. 

There is hepatic steatosis. No calcified gallstone is seen. There is a

small hiatal hernia. 

The spleen, pancreas, and adrenal glands are within normal limits.

No hydronephrosis. The urinary bladder is partially collapsed.

Questionable scattered punctate intrarenal calculi.

Reproductive organs are grossly within normal limits for patient's age.



Fluid-filled loops of small bowel and colon suggesting enterocolitis in

the proper clinical setting.

Scattered diverticulosis coli without evidence of acute diverticulitis.

No bowel obstruction is seen.  Appendix is normal in caliber.

Visualized aorta is normal in caliber. 

No acute osseous findings.





IMPRESSION: 

1.  Fluid-filled loops of small bowel and colon suggesting enterocolitis

in the proper clinical setting.

2.  Scattered diverticulosis coli without evidence of acute

diverticulitis. No bowel obstruction is seen.

## 2025-01-27 NOTE — ERN
General


Chief Complaint:  Abdominal Pain


Stated Complaint:  ABDOMINAL PAIN LLQ SINCE 2 PM


Time Seen by MD:  19:54


Source:  patient





History of Present Illness


Initial Comments


Patient is a 49-year-old male coming in with lower left quadrant pain.  Per 

patient the pain began earlier today.  He states that the pain is 8/10 intensity

and sharp without radiation.  He states he was never had pain like this before 

he also states he has no past medical history.


Allergies:  


Coded Allergies:  


     Penicillins (Unverified  Allergy, Unknown, 21)


Home Meds


Active Scripts


Lactobacillus Acidophilus (Acidophilus Probiotic) 500 Million Cell Capsule, 1 

CAP PO BID for 7 Days, #14 CAP 0 Refills


   Prov:ANDRIA TEJEDA MD         25


Pantoprazole Sodium (Protonix) 40 Mg Ectab, 1 TAB PO DAILY for 30 Days, #30 TAB 

0 Refills


   Prov:ANDRIA TEJEDA MD         25


Ibuprofen (Motrin/Advil) 600 Mg Tab, 600 MG PO TIDP PRN for PAIN, #30 TAB


   Prov:JODY MCKEON MD         10/27/23


Hydroxyzine Pamoate (Hydroxyzine Pamoate) 50 Mg Capsule, 50 MG PO QIDP PRN for 

ANXIETY/AGITATION, #30 CAP 2 Refills


   Prov:VIJI MARTEL Sr., MD         10/27/23


Acetaminophen (Tylenol) 500 Mg Tab, 500 MG PO Q6HPRN PRN for PAIN LEVEL 7 TO 10,

#30 TAB


   Prov:CARIE HILARIO MD         21





Past Medical History


Past Medical History:  No Pertinent History


Past Surgical History:  None





Family History


Family History:  Negative





Social History


Social History:  ETOH





ROS Dictation


CONSTITUTIONAL:  No chills, no fever, no weakness, no diaphoresis, no malaise.


HEAD/FACE:  No signs of trauma. 


EENT:  No eye pain, no blurred vision, no tearing, no double vision, no ear 

pain, no ear discharge, no nose pain, no nasal congestion, no throat pain, no 

throat swelling, no mouth pain. 


RESPIRATORY:  No cough, no orthopnea, no SOB, no stridor, no wheezing. 


CARDIOVASCULAR:  No chest pain, no edema, no palpitations, no syncope. 


GASTROINTESTINAL/ABDOMINAL:    abdominal pain, no constipation, no diarrhea, no 

nausea, no vomiting. 


GENITOURINARY:  No abnormal discharge, no dysuria, no frequent urination, no 

hematuria. No complaints of pain in the genitals. 


MUSCULOSKELETAL:  No back pain, no gout, no joint pain, no joint swelling, no 

muscle pain, no muscle stiffness, no neck pain. 


INTEGUMENTARY:  No change in color, no change in hair/nails, no dryness, no 

lesion, no lumps, no rash. 


NEUROLOGICAL/PSYCH:  No anxiety, not depressed, no emotional problem, no 

headache, no numbness, no pre-existing deficit, no history of seizures,  no 

tremors, no weakness. 


HEMATOLOGIC/LYMPHATIC:  Not anemic, no history of blood clots, no apparent 

bleeding, no bruising, glands not swollen.


All Systems Negative, Except as Noted.





Physical Exam


Physical Exam Dictation


VITAL SIGNS:  Reviewed. 


GENERAL APPEARANCE:  Alert, oriented x3, no acute distress, obese.


HEAD AND FACE: Non-traumatic. 


EYES:   PERRL, pink conjunctivas, eyelid no trauma, anterior chamber clear. 


EARS:  Pinnas intact and no signs of trauma or erythema.  Ear canals clear and 

no discharge. TMs no erythema. 


NOSE:  No discharge, no bleeding. 


OROPHARYNX:  Mouth normal, teeth no caries, tongue pink.  Pharynx clear, no 

erythema.  Tonsils no exudates, no abscesses noted. Mucous membrane moist.


NECK:  Supple, non-tender, no thyromegaly, no masses, no JVD, no bruits. 


BREAST:  Deferred.


CHEST:   No tenderness, no crepitus, no paradoxical movement, no retractions.


LUNGS:  Clear, well-ventilated, symmetric, no rales, no wheezing, no rhonchi, no

stridor, good breath sounds bilaterally. 


HEART:  Regular rate, regular rhythm, no murmur, no gallops. 


VASCULAR: No peripheral edema.


ABDOMEN: Soft, positive bowel sounds, nondistended, no guarding, left lower 

quadrant tender, no rebound, no masses no hepatomegaly, no splenomegaly, no 

Kidd's sign, no hernias. 


RECTAL: Deferred. 


GENITAL:  Deferred. 


NEUROLOGICAL:  Normal speech, gross motor function intact, gross sensory 

function intact.


MUSCULOSKELETAL:  Neck nontender, full range of motion, back nontender, full 

range of motion.


EXTREMITIES: Nontender, full range of motion. 


SKIN:  Color pink, dry, no turgor, no rash, no lacerations, no abrasions, no 

contusions.


LYMPHATICS:  Deferred.





Results


Laboratory and Microbiology


Lab and Micro Result





Laboratory Tests








Test


 25


20:24


 


White Blood Count


 3.5 K/uL


(4.8-10.8)  L


 


Red Blood Count


 4.36 MIL/uL


(4.50-6.20)  L


 


Hemoglobin


 13.4 g/dL


(14.0-18.0)  L


 


Hematocrit


 38.4 % (42-54)


L


 


Mean Corpuscular Volume


 88.1 fL


(79-99)


 


Mean Corpuscular Hemoglobin


 30.7 pg


(27.0-33.0)


 


Mean Corpuscular Hemoglobin


Concent 34.9 g/dL


(32.0-36.0)


 


Red Cell Distribution Width


 13.5 %


(11.0-15.5)


 


Platelet Count


 222 K/uL


(130-400)


 


Mean Platelet Volume


 8.5 fL


(7.5-10.5)


 


Immature Granulocyte % (Auto) 0.3 % (0-1)  


 


Neutrophils (%) (Auto)


 77.9 %


(40.0-77.0)  H


 


Lymphocytes (%) (Auto)


 8.0 %


(21.0-51.0)  L


 


Monocytes (%) (Auto)


 12.9 %


(3.0-13.0)


 


Eosinophils (%) (Auto)


 0.3 %


(0.0-8.0)


 


Basophils (%) (Auto)


 0.6 %


(0.0-5.0)


 


Neutrophils # (Auto)


 2.7 K/uL


(1.8-7.7)


 


Lymphocytes # (Auto)


 0.3 K/uL


(1.0-4.8)  L


 


Monocytes # (Auto)


 0.5 K/uL


(0.1-1.0)


 


Eosinophils # (Auto)


 0.01 K/uL


(0.00-0.70)


 


Basophils # (Auto)


 0.02 K/uL


(0.00-0.20)


 


Absolute Immature Granulocyte


(auto 0.01 K/uL


(0-1)


 


Nucleated Red Blood Cells


 0.0 %


(0.0-0.19)


 


White Cell Morphology Comment See comments  


 


Sodium Level


 134 mmol/L


(136-145)  L


 


Potassium Level


 3.5 mmol/L


(3.5-5.1)


 


Chloride Level


 96 mmol/L


(101-111)  L


 


Carbon Dioxide Level


 28 mmol/L


(21-32)


 


Blood Urea Nitrogen


 11 mg/dL


(7-18)


 


Creatinine


 0.8 mg/dL


(0.5-1.3)


 


Glomerular Filtration Rate


Calc 110 mL/min


(>90)


 


Random Glucose


 103 mg/dL


()


 


Total Calcium


 8.9 mg/dL


(8.5-10.1)








Labs Reviewed?:  Yes





EKG/XRAY/US/CT/MRI


CT Scan Comment


                            [facility]


                    [address] [place] [address]


                                 [phone]


                                        


                                 IMAGING REPORT


                                     Signed





PATIENT: OSVALDO COSTA                                MR#: [id number]


ACCOUNT#: [id number]                              : 1975


SEX: M AGE: 49                                      LOCATION: ED


ORDER DT: 25                             STATUS: REG ER


ACCESSION#: [id number]                            REPORT#: [id number]


SERVICE DT: 25





REASON: ABD PAIN


ORDERING PHYSICIAN: ANDRIA TEJEDA MD


PROCEDURE: ABD PEL WO  -  CT ABDOMEN/PELVIS W/O CONTRAST





CT ABDOMEN/PELVIS W/O CONTRAST





INDICATION:   ABD PAIN





TECHNIQUE: CT ABDOMEN/PELVIS W/O CONTRAST. Oral contrast was not given.


Coronal and sagittal reformats were performed. CT was performed with one


or more of the following dose reduction techniques: Automated exposure


control, adjustment of the mA and/or kV according to the patient's size,


or use of the iterative reconstruction technique.  


Comparison: 10/23/2023





FINDINGS: 


The noncontrast nature this study limits evaluation of abdominal


viscera.


No pulmonary consolidation or pleural effusion is seen. 


There is hepatic steatosis. No calcified gallstone is seen. There is a


small hiatal hernia. 


The spleen, pancreas, and adrenal glands are within normal limits.


No hydronephrosis. The urinary bladder is partially collapsed.


Questionable scattered punctate intrarenal calculi.


Reproductive organs are grossly within normal limits for patient's age.





Fluid-filled loops of small bowel and colon suggesting enterocolitis in


the proper clinical setting.


Scattered diverticulosis coli without evidence of acute diverticulitis.


No bowel obstruction is seen.  Appendix is normal in caliber.


Visualized aorta is normal in caliber. 


No acute osseous findings.








IMPRESSION: 


1.  Fluid-filled loops of small bowel and colon suggesting enterocolitis


in the proper clinical setting.


2.  Scattered diverticulosis coli without evidence of acute


diverticulitis. No bowel obstruction is seen. 




















DICTATED BY: MAHAMED TINAJERO MD


DATE: 25





ELECTRONICALLY SIGNED BY: MAHAMED TINAJERO MD


DATE: 25





[facility]


MDM: 


Differential diagnosis:  Gastroenteritis, abdominal discomfort,


Patient is a 49-year-old male coming in to be evaluated for left lower quadrant 

pain.  CT of the abdomen did not disclose acute findings.  Laboratory workup 

negative for acute findings.  Only findings per imaging study was a fluid-filled

colon suggestive of gastroenteritis.  I have advised patient diet modification 

and I will prescribe him some symptomatic medication for abdominal discomfort.


ED Course





Orders








Procedure Category Date Status





  Time 


 


Cbc With Differential LAB 25 Complete





  20:15 


 


Urinalysis Profile LAB 25 Logged





  20:15 


 


Lactated Ringers PHA 25 Complete





1000ml (Lactated  20:30 


 


Pantoprazole 40mg Inj PHA 25 Complete





 (Protonix 40mg Inj  20:30 


 


Ct Abdomen/Pelvis W/O CT 25 Resulted





Contrast  20:15 


 


Basic Metabolic Panel LAB 25 Complete





  20:15 








Current Medications








 Medications


  (Trade)  Dose


 Ordered  Sig/Duncan


 Route


 PRN Reason  Start Time


 Stop Time Status Last Admin


Dose Admin


 


 Lactated Ringer's  1,000 ml @ 


 0 mls/hr  ONCE  ONCE


 IV


   25 20:30


 25 20:31 DC  





 


 Pantoprazole


 Sodium


  (PROTonix 40MG


 INJ)  40 mg  ONCE  ONCE


 IVP


   25 20:30


 25 20:31 DC  











Vital Signs








  Date Time  Temp Pulse Resp B/P (MAP) Pulse Ox O2 Delivery O2 Flow Rate FiO2


 


25 19:50 98.1 73 16 164/89 99 Room Air 0 











DX & DISP


Disposition:  Discharge


Departure


Impression:  


   Primary Impression:  Viral gastroenteritis


Condition:  Stable


Scripts


Lactobacillus Acidophilus (Acidophilus Probiotic) 500 Million Cell Capsule


1 CAP PO BID for 7 Days, #14 CAP 0 Refills


   Prov: ANDRIA TEJEDA MD         25 


Pantoprazole Sodium (Protonix) 40 Mg Ectab


1 TAB PO DAILY for 30 Days, #30 TAB 0 Refills


   Prov: ANDRIA TEJEDA MD         25





Additional Instructions:  


FOLLOW-UP WITH PRIMARY CARE PROVIDER IN 1 TO 2 DAYS.  TAKE MEDICATIONS AS 

DIRECTED HERE IN THE EMERGENCY ROOM.  OKAY TO CONTINUE HOME MEDICATIONS UNLESS 

OTHERWISE DISCUSSED DURING YOUR VISIT IN THE EMERGENCY ROOM TODAY.  RETURN TO 

YOUR NEAREST EMERGENCY ROOM IF SYMPTOMS WORSEN OR IF THERE IS NO IMPROVEMENT.  

CALL 911 IF YOU NEED IMMEDIATE ASSISTANCE.  TAKE TYLENOL  OVER-THE-COUNTER AS 

NEEDED AND IF NO CONTRAINDICATIONS ARE PRESENT.  INCREASE ORAL HYDRATION.  A 

WOUND CULTURE OR URINE CULTURE WAS ORDERED HERE IN THE EMERGENCY ROOM DEPARTMENT

PLEASE FOLLOW-UP WITH PRIMARY CARE PROVIDER AND ADVISE THEM TO GET REPEAT PORTS 

FROM OUR FACILITY.  IF YOU HAD ANY ACE WRAP/SPLINTS THAT WERE APPLIED HERE, 

PLEASE DO NOT REMOVE THEM UNTIL YOU SEE YOUR PRIMARY CARE OR SPECIALTY.





Referrals:


Referrals:  


FATMATA REYES MD (PCP)


Time of Disposition:  21:47











ANDRIA TEJEDA MD                2025 20:24

## 2025-07-21 ENCOUNTER — HOSPITAL ENCOUNTER (EMERGENCY)
Dept: HOSPITAL 90 - EDH | Age: 50
LOS: 1 days | Discharge: HOME | End: 2025-07-22
Payer: SELF-PAY

## 2025-07-21 VITALS — BODY MASS INDEX: 21.49 KG/M2 | WEIGHT: 128.97 LBS | HEIGHT: 65 IN

## 2025-07-21 DIAGNOSIS — Z79.899: ICD-10-CM

## 2025-07-21 DIAGNOSIS — Z88.0: ICD-10-CM

## 2025-07-21 DIAGNOSIS — R44.0: Primary | ICD-10-CM

## 2025-07-21 DIAGNOSIS — F41.9: ICD-10-CM

## 2025-07-21 DIAGNOSIS — F10.20: ICD-10-CM

## 2025-07-21 LAB
AMORPH SED URNS QL MICRO: (no result) /LPF
AMPHETAMINES UR QL SCN: NEGATIVE
APAP SERPL-MCNC: < 1 MCG/ML (ref 10–29)
APPEARANCE UR: CLEAR
ARTIFACT: (no result) /HPF (ref 0–1)
BARBITURATES UR QL SCN: NEGATIVE
BASOPHILS # BLD AUTO: 0.06 K/UL (ref 0–0.2)
BASOPHILS NFR BLD AUTO: 1.7 % (ref 0–5)
BENZODIAZ UR QL SCN: NEGATIVE
BILIRUB UR QL STRIP: NEGATIVE MG/DL
BROAD CASTS [PRESENCE] IN URINE BY COMPUTER ASSISTED METHOD: (no result) /LPF
BUN SERPL-MCNC: 5 MG/DL (ref 7–18)
BZE UR QL SCN: NEGATIVE
CA CARBONATE CRY UR QL COMP ASSIST: (no result) /LPF
CA PHOS CRY URNS QL MICRO: (no result) /LPF
CANNABINOIDS UR QL SCN: NEGATIVE
CAOX CRY URNS QL MICRO: (no result) /LPF
CASTS URNS QL MICRO: 1 /LPF
CHLORIDE SERPL-SCNC: 97 MMOL/L (ref 101–111)
CO2 SERPL-SCNC: 28 MMOL/L (ref 21–32)
COARSE GRAN CASTS URNS QL MICRO: (no result) /LPF
COLOR UR: (no result)
CREAT SERPL-MCNC: 0.8 MG/DL (ref 0.5–1.3)
CRYSTALS UR QL AUTO: (no result) /HPF
CRYSTALS URNS QL MICRO: (no result) /LPF
DEPRECATED SQUAMOUS URNS QL MICRO: (no result) /HPF (ref 0–2)
EOSINOPHIL # BLD AUTO: 0.06 K/UL (ref 0–0.7)
EOSINOPHIL NFR BLD AUTO: 1.7 % (ref 0–8)
ERYTHROCYTE [DISTWIDTH] IN BLOOD BY AUTOMATED COUNT: 13.4 % (ref 11–15.5)
ETHANOL SERPL-MCNC: 190 MG/DL (ref 0–10)
FINE GRAN CASTS URNS QL MICRO: (no result) /LPF
GFR SERPL CREATININE-BSD FRML MDRD: 109 ML/MIN (ref 90–?)
GLUCOSE SERPL-MCNC: 102 MG/DL (ref 70–105)
GLUCOSE UR STRIP-MCNC: NEGATIVE MG/DL
HCT VFR BLD AUTO: 37.9 % (ref 42–54)
HGB UR QL STRIP: (no result)
HYALINE CASTS URNS QL MICRO: (no result) /LPF
IMM GRANULOCYTES # BLD: 0.01 K/UL (ref 0–1)
KETONES UR STRIP-MCNC: 5 MG/DL
LEUCINE CRYSTALS [PRESENCE] IN URINE BY COMPUTER ASSISTED METHOD: (no result) /HPF
LEUKOCYTE ESTERASE UR QL STRIP: NEGATIVE LEU/UL
LYMPHOCYTES # SPEC AUTO: 0.7 K/UL (ref 1–4.8)
LYMPHOCYTES NFR SPEC AUTO: 18.8 % (ref 21–51)
Lab: (no result) /HPF
Lab: (no result) /HPF (ref 0–1)
MCH RBC QN AUTO: 31.9 PG (ref 27–33)
MCHC RBC AUTO-ENTMCNC: 35.1 G/DL (ref 32–36)
MCV RBC AUTO: 90.9 FL (ref 79–99)
MONOCYTES # BLD AUTO: 0.6 K/UL (ref 0.1–1)
MONOCYTES NFR BLD AUTO: 16.8 % (ref 3–13)
MUCOUS THREADS URNS QL MICRO: (no result) LPF
NEUTROPHILS # BLD AUTO: 2.1 K/UL (ref 1.8–7.7)
NEUTROPHILS NFR BLD AUTO: 60.7 % (ref 40–77)
NITRITE UR QL STRIP: NEGATIVE
NON-SQ EPI CELLS #/AREA URNS AUTO: (no result) /HPF (ref 0–2)
NRBC BLD MANUAL-RTO: 0 % (ref 0–0.19)
OPIATES UR QL SCN: NEGATIVE
PCP UR QL SCN: NEGATIVE
PH UR STRIP: 6 [PH] (ref 5–8)
PLATELET # BLD AUTO: 243 K/UL (ref 130–400)
POTASSIUM SERPL-SCNC: 3.8 MMOL/L (ref 3.5–5.1)
PROT UR QL STRIP: 20 MG/DL
RBC # BLD AUTO: 4.17 MIL/UL (ref 4.5–6.2)
RENAL EPI CELLS URNS QL MICRO: (no result) /HPF
SALICYLATES SERPL-MCNC: < 2.8 MG/DL (ref 2.8–20)
SODIUM SERPL-SCNC: 136 MMOL/L (ref 136–145)
SP GR UR STRIP: 1.01 (ref 1–1.03)
SPERM URNS QL MICRO: (no result) /HPF
STARCH GRANULES URNS QL MICRO: (no result) /LPF
T VAGINALIS URNS QL MICRO: (no result) /LPF
TRANS CELLS URNS QL MICRO: (no result) /HPF
TRI-PHOS CRY URNS QL MICRO: (no result) /LPF
URATE CRY URNS QL MICRO: (no result) /LPF
UROBILINOGEN UR STRIP-MCNC: 0.2 MG/DL (ref 0.2–1)
WAXY CASTS UR QL COMP ASSIST: (no result) /LPF
WBC # BLD AUTO: 3.5 K/UL (ref 4.8–10.8)
WBC #/AREA URNS HPF: (no result) /HPF (ref 0–1)
WBC CLUMPS #/AREA URNS AUTO: (no result) /HPF (ref 0–1)
WBC MORPH BLD: (no result)
YEAST HYPHAE UR QL COMP ASSIST: (no result) /HPF
YEAST URNS QL MICRO: (no result) /HPF

## 2025-07-21 PROCEDURE — 80305 DRUG TEST PRSMV DIR OPT OBS: CPT

## 2025-07-21 PROCEDURE — 85025 COMPLETE CBC W/AUTO DIFF WBC: CPT

## 2025-07-21 PROCEDURE — 99283 EMERGENCY DEPT VISIT LOW MDM: CPT

## 2025-07-21 PROCEDURE — 81001 URINALYSIS AUTO W/SCOPE: CPT

## 2025-07-21 PROCEDURE — 36415 COLL VENOUS BLD VENIPUNCTURE: CPT

## 2025-07-21 PROCEDURE — 80048 BASIC METABOLIC PNL TOTAL CA: CPT

## 2025-07-21 PROCEDURE — G0481 DRUG TEST DEF 8-14 CLASSES: HCPCS

## 2025-07-21 RX ADMIN — SODIUM CHLORIDE ONE MLS/HR: 0.9 INJECTION, SOLUTION INTRAVENOUS at 18:43

## 2025-07-22 VITALS
DIASTOLIC BLOOD PRESSURE: 69 MMHG | OXYGEN SATURATION: 99 % | HEART RATE: 69 BPM | TEMPERATURE: 97.9 F | SYSTOLIC BLOOD PRESSURE: 117 MMHG | RESPIRATION RATE: 18 BRPM

## 2025-07-23 ENCOUNTER — HOSPITAL ENCOUNTER (EMERGENCY)
Dept: HOSPITAL 90 - EDH | Age: 50
Discharge: HOME | End: 2025-07-23
Payer: SELF-PAY

## 2025-07-23 VITALS
OXYGEN SATURATION: 99 % | SYSTOLIC BLOOD PRESSURE: 167 MMHG | HEART RATE: 59 BPM | DIASTOLIC BLOOD PRESSURE: 85 MMHG | TEMPERATURE: 98.3 F | RESPIRATION RATE: 16 BRPM

## 2025-07-23 VITALS — WEIGHT: 123.9 LBS | BODY MASS INDEX: 20.64 KG/M2 | HEIGHT: 65 IN

## 2025-07-23 DIAGNOSIS — Z79.899: ICD-10-CM

## 2025-07-23 DIAGNOSIS — Z88.0: ICD-10-CM

## 2025-07-23 DIAGNOSIS — F41.9: ICD-10-CM

## 2025-07-23 DIAGNOSIS — F28: ICD-10-CM

## 2025-07-23 DIAGNOSIS — F20.9: Primary | ICD-10-CM

## 2025-07-23 LAB
AMPHETAMINES UR QL SCN: NEGATIVE
APAP SERPL-MCNC: < 1 MCG/ML (ref 10–29)
BARBITURATES UR QL SCN: NEGATIVE
BASOPHILS # BLD AUTO: 0.02 K/UL (ref 0–0.2)
BASOPHILS NFR BLD AUTO: 0.3 % (ref 0–5)
BENZODIAZ UR QL SCN: NEGATIVE
BUN SERPL-MCNC: 12 MG/DL (ref 7–18)
BZE UR QL SCN: NEGATIVE
CANNABINOIDS UR QL SCN: NEGATIVE
CHLORIDE SERPL-SCNC: 97 MMOL/L (ref 101–111)
CK SERPL-CCNC: 503 U/L (ref 21–232)
CO2 SERPL-SCNC: 28 MMOL/L (ref 21–32)
CREAT SERPL-MCNC: 0.7 MG/DL (ref 0.5–1.3)
EOSINOPHIL # BLD AUTO: 0.23 K/UL (ref 0–0.7)
EOSINOPHIL NFR BLD AUTO: 3.2 % (ref 0–8)
ERYTHROCYTE [DISTWIDTH] IN BLOOD BY AUTOMATED COUNT: 12.9 % (ref 11–15.5)
ETHANOL SERPL-MCNC: < 3 MG/DL (ref 0–10)
GFR SERPL CREATININE-BSD FRML MDRD: 114 ML/MIN (ref 90–?)
GLUCOSE SERPL-MCNC: 167 MG/DL (ref 70–105)
HCT VFR BLD AUTO: 37.5 % (ref 42–54)
IMM GRANULOCYTES # BLD: 0.06 K/UL (ref 0–1)
LYMPHOCYTES # SPEC AUTO: 0.3 K/UL (ref 1–4.8)
LYMPHOCYTES NFR SPEC AUTO: 4 % (ref 21–51)
MCH RBC QN AUTO: 32.2 PG (ref 27–33)
MCHC RBC AUTO-ENTMCNC: 36 G/DL (ref 32–36)
MCV RBC AUTO: 89.5 FL (ref 79–99)
MONOCYTES # BLD AUTO: 0.6 K/UL (ref 0.1–1)
MONOCYTES NFR BLD AUTO: 8.7 % (ref 3–13)
NEUTROPHILS # BLD AUTO: 6 K/UL (ref 1.8–7.7)
NEUTROPHILS NFR BLD AUTO: 83 % (ref 40–77)
NRBC BLD MANUAL-RTO: 0 % (ref 0–0.19)
OPIATES UR QL SCN: NEGATIVE
PCP UR QL SCN: NEGATIVE
PLATELET # BLD AUTO: 238 K/UL (ref 130–400)
POTASSIUM SERPL-SCNC: 3.2 MMOL/L (ref 3.5–5.1)
RBC # BLD AUTO: 4.19 MIL/UL (ref 4.5–6.2)
SALICYLATES SERPL-MCNC: < 2.8 MG/DL (ref 2.8–20)
SODIUM SERPL-SCNC: 134 MMOL/L (ref 136–145)
WBC # BLD AUTO: 7.2 K/UL (ref 4.8–10.8)

## 2025-07-23 PROCEDURE — 96372 THER/PROPH/DIAG INJ SC/IM: CPT

## 2025-07-23 PROCEDURE — 82550 ASSAY OF CK (CPK): CPT

## 2025-07-23 PROCEDURE — 84484 ASSAY OF TROPONIN QUANT: CPT

## 2025-07-23 PROCEDURE — 80048 BASIC METABOLIC PNL TOTAL CA: CPT

## 2025-07-23 PROCEDURE — 85025 COMPLETE CBC W/AUTO DIFF WBC: CPT

## 2025-07-23 PROCEDURE — G0481 DRUG TEST DEF 8-14 CLASSES: HCPCS

## 2025-07-23 PROCEDURE — 80305 DRUG TEST PRSMV DIR OPT OBS: CPT

## 2025-07-23 PROCEDURE — 93005 ELECTROCARDIOGRAM TRACING: CPT

## 2025-07-23 PROCEDURE — 99285 EMERGENCY DEPT VISIT HI MDM: CPT

## 2025-07-23 PROCEDURE — 36415 COLL VENOUS BLD VENIPUNCTURE: CPT

## 2025-07-23 RX ADMIN — ZIPRASIDONE MESYLATE ONE MG: 20 INJECTION, POWDER, LYOPHILIZED, FOR SOLUTION INTRAMUSCULAR at 05:29

## 2025-07-23 RX ADMIN — SODIUM CHLORIDE ONE MLS/HR: 0.9 INJECTION, SOLUTION INTRAVENOUS at 08:51
